# Patient Record
Sex: MALE
[De-identification: names, ages, dates, MRNs, and addresses within clinical notes are randomized per-mention and may not be internally consistent; named-entity substitution may affect disease eponyms.]

---

## 2022-02-08 ENCOUNTER — TRANSCRIPTION ENCOUNTER (OUTPATIENT)
Age: 47
End: 2022-02-08

## 2022-02-14 ENCOUNTER — APPOINTMENT (OUTPATIENT)
Dept: GASTROENTEROLOGY | Facility: CLINIC | Age: 47
End: 2022-02-14
Payer: MEDICAID

## 2022-02-14 VITALS
HEIGHT: 66 IN | OXYGEN SATURATION: 99 % | TEMPERATURE: 97.5 F | BODY MASS INDEX: 25.55 KG/M2 | DIASTOLIC BLOOD PRESSURE: 70 MMHG | WEIGHT: 159 LBS | HEART RATE: 76 BPM | SYSTOLIC BLOOD PRESSURE: 114 MMHG

## 2022-02-14 DIAGNOSIS — Z00.00 ENCOUNTER FOR GENERAL ADULT MEDICAL EXAMINATION W/OUT ABNORMAL FINDINGS: ICD-10-CM

## 2022-02-14 DIAGNOSIS — Z12.10 ENCOUNTER FOR SCREENING FOR MALIGNANT NEOPLASM OF INTESTINAL TRACT, UNSPECIFIED: ICD-10-CM

## 2022-02-14 DIAGNOSIS — R10.13 EPIGASTRIC PAIN: ICD-10-CM

## 2022-02-14 DIAGNOSIS — K21.9 GASTRO-ESOPHAGEAL REFLUX DISEASE W/OUT ESOPHAGITIS: ICD-10-CM

## 2022-02-14 PROCEDURE — 99202 OFFICE O/P NEW SF 15 MIN: CPT

## 2022-02-14 RX ORDER — OMEPRAZOLE 40 MG/1
40 CAPSULE, DELAYED RELEASE ORAL
Qty: 30 | Refills: 0 | Status: ACTIVE | COMMUNITY
Start: 2022-02-08

## 2022-02-14 NOTE — REVIEW OF SYSTEMS
[As Noted in HPI] : as noted in HPI [Abdominal Pain] : abdominal pain [Heartburn] : heartburn [Negative] : Genitourinary

## 2022-02-14 NOTE — ASSESSMENT
[FreeTextEntry1] : The patient is a 46-year-old male who enjoys good health.  He recently noticed dyspeptic symptoms in the form of fatty food intolerance with abdominal bloating and gas.  He did have several episodes of vomiting with no fever.  The differential diagnosis does include biliary colic and cholelithiasis.  He also may have an underlying gastritis.  The patient will go for an abdominal sonogram and stay on a low-fat meal.  Patient is already on a proton pump inhibitor for the past 3 days and will continue this medication.  The patient is also due for colorectal cancer screening due to the fact that he is over 45 years old and did have hematochezia.  The patient will be scheduled for both exams on the same day and he was told to arrange the date before leaving the office today. Once the  results are available I will discuss further intervention with the patient.

## 2022-02-14 NOTE — PHYSICAL EXAM
[General Appearance - Alert] : alert [General Appearance - In No Acute Distress] : in no acute distress [General Appearance - Well Developed] : well developed [General Appearance - Well Nourished] : well nourished [Respiration, Rhythm And Depth] : normal respiratory rhythm and effort [Auscultation Breath Sounds / Voice Sounds] : lungs were clear to auscultation bilaterally [Apical Impulse] : the apical impulse was normal [Heart Rate And Rhythm] : heart rate was normal and rhythm regular [Heart Sounds] : normal S1 and S2 [Bowel Sounds] : normal bowel sounds [Abdomen Soft] : soft [Abdomen Tenderness] : non-tender [] : no hepato-splenomegaly

## 2022-03-07 ENCOUNTER — NON-APPOINTMENT (OUTPATIENT)
Age: 47
End: 2022-03-07

## 2022-06-20 ENCOUNTER — NON-APPOINTMENT (OUTPATIENT)
Age: 47
End: 2022-06-20

## 2022-07-13 ENCOUNTER — NON-APPOINTMENT (OUTPATIENT)
Age: 47
End: 2022-07-13

## 2022-07-25 ENCOUNTER — NON-APPOINTMENT (OUTPATIENT)
Age: 47
End: 2022-07-25

## 2022-08-08 ENCOUNTER — NON-APPOINTMENT (OUTPATIENT)
Age: 47
End: 2022-08-08

## 2022-09-23 ENCOUNTER — NON-APPOINTMENT (OUTPATIENT)
Age: 47
End: 2022-09-23

## 2022-10-27 ENCOUNTER — NON-APPOINTMENT (OUTPATIENT)
Age: 47
End: 2022-10-27

## 2022-11-10 ENCOUNTER — NON-APPOINTMENT (OUTPATIENT)
Age: 47
End: 2022-11-10

## 2022-11-22 ENCOUNTER — NON-APPOINTMENT (OUTPATIENT)
Age: 47
End: 2022-11-22

## 2024-07-16 NOTE — HISTORY OF PRESENT ILLNESS
[FreeTextEntry1] : I saw Patient  Duke Chavez the office today.  The patient is a 46-year-old male who has no history of hypertension diabetes or coronary issues.  The patient's appetite is generally good with no unexplained weight loss.  The patient has 1 caffeinated beverage a week does not smoke and does not abuse alcohol.  Most recently the patient has been noticing dyspeptic symptoms.  Patient has postprandial bloating especially after fatty or large meals.  This caused him to have epigastric burning and some discomfort which may rarely radiate to the back area.  There have been several episodes of vomiting 1 of which was bilious in nature.  The patient's bowel movements are essentially normal and when he does strain he notices rare bright red blood on the toilet tissue.  Patient has no urinary symptoms.  The patient states his mother does have stomach issues and may have been diagnosed with H. pylori.  The patient has never had an endoscopy or colonoscopy.
N/A